# Patient Record
Sex: MALE | Race: WHITE | NOT HISPANIC OR LATINO | Employment: FULL TIME | URBAN - METROPOLITAN AREA
[De-identification: names, ages, dates, MRNs, and addresses within clinical notes are randomized per-mention and may not be internally consistent; named-entity substitution may affect disease eponyms.]

---

## 2018-04-26 ENCOUNTER — HOSPITAL ENCOUNTER (OUTPATIENT)
Facility: MEDICAL CENTER | Age: 63
End: 2018-04-26
Attending: SURGERY | Admitting: SURGERY
Payer: COMMERCIAL

## 2019-01-09 ENCOUNTER — APPOINTMENT (OUTPATIENT)
Dept: RADIOLOGY | Facility: MEDICAL CENTER | Age: 64
End: 2019-01-09
Attending: FAMILY MEDICINE
Payer: COMMERCIAL

## 2019-06-24 ENCOUNTER — OFFICE VISIT (OUTPATIENT)
Dept: URGENT CARE | Facility: MEDICAL CENTER | Age: 64
End: 2019-06-24
Payer: COMMERCIAL

## 2019-06-24 VITALS
HEART RATE: 83 BPM | TEMPERATURE: 97.6 F | SYSTOLIC BLOOD PRESSURE: 126 MMHG | HEIGHT: 71 IN | BODY MASS INDEX: 27.3 KG/M2 | DIASTOLIC BLOOD PRESSURE: 90 MMHG | OXYGEN SATURATION: 93 % | WEIGHT: 195 LBS

## 2019-06-24 DIAGNOSIS — R05.9 COUGH: ICD-10-CM

## 2019-06-24 DIAGNOSIS — J22 LRTI (LOWER RESPIRATORY TRACT INFECTION): ICD-10-CM

## 2019-06-24 DIAGNOSIS — R06.2 WHEEZING: ICD-10-CM

## 2019-06-24 PROCEDURE — 99214 OFFICE O/P EST MOD 30 MIN: CPT | Performed by: NURSE PRACTITIONER

## 2019-06-24 RX ORDER — ESCITALOPRAM OXALATE 20 MG/1
TABLET ORAL
COMMUNITY
Start: 2017-12-22 | End: 2022-02-22

## 2019-06-24 RX ORDER — BENZONATATE 100 MG/1
100 CAPSULE ORAL 3 TIMES DAILY PRN
Qty: 40 CAP | Refills: 0 | Status: SHIPPED | OUTPATIENT
Start: 2019-06-24 | End: 2022-02-22

## 2019-06-24 RX ORDER — AMOXICILLIN AND CLAVULANATE POTASSIUM 875; 125 MG/1; MG/1
1 TABLET, FILM COATED ORAL 2 TIMES DAILY
Qty: 20 TAB | Refills: 0 | Status: SHIPPED | OUTPATIENT
Start: 2019-06-24 | End: 2019-07-04

## 2019-06-24 RX ORDER — ALBUTEROL SULFATE 90 UG/1
2 AEROSOL, METERED RESPIRATORY (INHALATION) EVERY 6 HOURS PRN
Qty: 8.5 G | Refills: 0 | Status: SHIPPED | OUTPATIENT
Start: 2019-06-24 | End: 2022-02-22

## 2019-06-24 RX ORDER — ESCITALOPRAM OXALATE 20 MG/1
TABLET ORAL
Refills: 1 | COMMUNITY
Start: 2019-04-17 | End: 2022-02-22

## 2019-06-24 RX ORDER — METHYLPHENIDATE HYDROCHLORIDE 20 MG/1
CAPSULE, EXTENDED RELEASE ORAL
COMMUNITY
Start: 2016-12-07 | End: 2022-02-22

## 2019-06-24 ASSESSMENT — ENCOUNTER SYMPTOMS
SHORTNESS OF BREATH: 0
COUGH: 1
HEADACHES: 0
SORE THROAT: 0
HEARTBURN: 0
CHILLS: 1
WEIGHT LOSS: 0
HEMOPTYSIS: 0
SWEATS: 0
MYALGIAS: 0
WHEEZING: 1
RHINORRHEA: 0
FEVER: 0

## 2019-06-24 ASSESSMENT — COPD QUESTIONNAIRES: COPD: 0

## 2019-06-24 NOTE — PROGRESS NOTES
"Subjective:      Roderick Diane is a 63 y.o. male who presents with Congestion (x6 days. Chest congestion, productive cough, SOB.)        Reviewed past medical, surgical and family history with patient. Reviewed prescription and OTC medications with patient in electronic health record today.     No Known Allergies      Cough   This is a new problem. The current episode started in the past 7 days. The problem has been rapidly worsening. The problem occurs constantly. The cough is non-productive. Associated symptoms include chills and wheezing. Pertinent negatives include no chest pain, ear congestion, ear pain, fever, headaches, heartburn, hemoptysis, myalgias, nasal congestion, postnasal drip, rash, rhinorrhea, sore throat, shortness of breath, sweats or weight loss. Nothing aggravates the symptoms. He has tried OTC cough suppressant for the symptoms. The treatment provided mild relief. His past medical history is significant for bronchitis. There is no history of asthma, COPD, emphysema, environmental allergies or pneumonia.         Review of Systems   Constitutional: Positive for chills. Negative for fever and weight loss.   HENT: Negative for ear pain, postnasal drip, rhinorrhea and sore throat.    Respiratory: Positive for cough and wheezing. Negative for hemoptysis and shortness of breath.    Cardiovascular: Negative for chest pain.   Gastrointestinal: Negative for heartburn.   Musculoskeletal: Negative for myalgias.   Skin: Negative for rash.   Neurological: Negative for headaches.   Endo/Heme/Allergies: Negative for environmental allergies.           Objective:     /90   Pulse 83   Temp 36.4 °C (97.6 °F)   Ht 1.803 m (5' 11\")   Wt 88.5 kg (195 lb)   SpO2 93%   BMI 27.20 kg/m²      Physical Exam   Constitutional: He is oriented to person, place, and time. Vital signs are normal. He appears well-developed and well-nourished.  Non-toxic appearance. No distress.   HENT:   Head: Normocephalic and " atraumatic.   Right Ear: External ear normal.   Left Ear: External ear normal.   Nose: Nose normal.   Mouth/Throat: Oropharynx is clear and moist.   Eyes: Pupils are equal, round, and reactive to light. Conjunctivae are normal. Right eye exhibits no discharge. Left eye exhibits no discharge.   Neck: Normal range of motion. Neck supple.   Cardiovascular: Normal rate and regular rhythm.    Pulmonary/Chest: Effort normal. No tachypnea. He has wheezes. He has rhonchi.   Lymphadenopathy:        Head (right side): No submental, no submandibular and no tonsillar adenopathy present.        Head (left side): No submental, no submandibular and no tonsillar adenopathy present.     He has no cervical adenopathy.        Right: No supraclavicular adenopathy present.        Left: No supraclavicular adenopathy present.   Neurological: He is alert and oriented to person, place, and time.   Skin: Skin is warm and dry. Capillary refill takes less than 2 seconds.   Psychiatric: He has a normal mood and affect. His behavior is normal.   Nursing note and vitals reviewed.              Assessment/Plan:     1. LRTI (lower respiratory tract infection)  amoxicillin-clavulanate (AUGMENTIN) 875-125 MG Tab   2. Wheezing  albuterol 108 (90 Base) MCG/ACT Aero Soln inhalation aerosol   3. Cough  benzonatate (TESSALON) 100 MG Cap     Educated in proper administration of medication(s) ordered today including safety, possible SE, risks, benefits, rationale and alternatives to therapy.   Return to clinic or PCP  5-7 days if current symptoms are not resolving in a satisfactory manner or sooner if new or worsening symptoms occur.   Patient was advised of signs and symptoms which would warrant further evaluation and /or emergent evaluation in ER.  Verbalized agreement with this treatment plan and seemed to understand without barriers. Questions were encouraged and answered to patients satisfaction.   Aftercare instructions were given to pt

## 2021-03-03 DIAGNOSIS — Z23 NEED FOR VACCINATION: ICD-10-CM

## 2022-01-25 ENCOUNTER — TELEPHONE (OUTPATIENT)
Dept: MEDICAL GROUP | Facility: OTHER | Age: 67
End: 2022-01-25

## 2022-01-25 DIAGNOSIS — Z00.00 PREVENTATIVE HEALTH CARE: Primary | ICD-10-CM

## 2022-01-31 NOTE — TELEPHONE ENCOUNTER
Phone Number Called: [phone]    Call outcome: Spoke to patient regarding message below.    Message: Called and spoke with patient, advised that his labs have been ordered, he will come pick them up from the office.

## 2022-02-08 ENCOUNTER — APPOINTMENT (OUTPATIENT)
Dept: MEDICAL GROUP | Facility: OTHER | Age: 67
End: 2022-02-08
Payer: MEDICARE

## 2022-02-22 ENCOUNTER — OFFICE VISIT (OUTPATIENT)
Dept: MEDICAL GROUP | Facility: OTHER | Age: 67
End: 2022-02-22
Payer: MEDICARE

## 2022-02-22 VITALS
HEIGHT: 71 IN | BODY MASS INDEX: 29.82 KG/M2 | TEMPERATURE: 99.1 F | SYSTOLIC BLOOD PRESSURE: 150 MMHG | DIASTOLIC BLOOD PRESSURE: 80 MMHG | RESPIRATION RATE: 95 BRPM | WEIGHT: 213 LBS | HEART RATE: 113 BPM

## 2022-02-22 DIAGNOSIS — R79.89 ELEVATED TSH: ICD-10-CM

## 2022-02-22 DIAGNOSIS — E78.00 PURE HYPERCHOLESTEROLEMIA: ICD-10-CM

## 2022-02-22 DIAGNOSIS — Z12.11 SCREENING FOR COLON CANCER: ICD-10-CM

## 2022-02-22 DIAGNOSIS — Z00.00 PREVENTATIVE HEALTH CARE: ICD-10-CM

## 2022-02-22 DIAGNOSIS — F51.01 PRIMARY INSOMNIA: ICD-10-CM

## 2022-02-22 DIAGNOSIS — Z00.00 ENCOUNTER FOR PREVENTIVE CARE: ICD-10-CM

## 2022-02-22 DIAGNOSIS — E03.8 SUBCLINICAL HYPOTHYROIDISM: ICD-10-CM

## 2022-02-22 DIAGNOSIS — E78.1 PURE HYPERTRIGLYCERIDEMIA: ICD-10-CM

## 2022-02-22 DIAGNOSIS — E66.3 OVERWEIGHT (BMI 25.0-29.9): ICD-10-CM

## 2022-02-22 DIAGNOSIS — R11.0 NAUSEA: ICD-10-CM

## 2022-02-22 PROCEDURE — G0008 ADMIN INFLUENZA VIRUS VAC: HCPCS | Performed by: FAMILY MEDICINE

## 2022-02-22 PROCEDURE — 90472 IMMUNIZATION ADMIN EACH ADD: CPT | Performed by: FAMILY MEDICINE

## 2022-02-22 PROCEDURE — 90715 TDAP VACCINE 7 YRS/> IM: CPT | Performed by: FAMILY MEDICINE

## 2022-02-22 PROCEDURE — 90662 IIV NO PRSV INCREASED AG IM: CPT | Performed by: FAMILY MEDICINE

## 2022-02-22 PROCEDURE — 99214 OFFICE O/P EST MOD 30 MIN: CPT | Mod: 25 | Performed by: FAMILY MEDICINE

## 2022-02-22 RX ORDER — TRAZODONE HYDROCHLORIDE 50 MG/1
TABLET ORAL
Qty: 30 TABLET | Refills: 3 | Status: SHIPPED | OUTPATIENT
Start: 2022-02-22 | End: 2023-01-04

## 2022-02-22 RX ORDER — PROMETHAZINE HYDROCHLORIDE 25 MG/1
25 TABLET ORAL EVERY 6 HOURS PRN
Qty: 60 TABLET | Refills: 1 | Status: SHIPPED | OUTPATIENT
Start: 2022-02-22

## 2022-02-22 ASSESSMENT — ENCOUNTER SYMPTOMS
SORE THROAT: 0
PALPITATIONS: 0
FEVER: 0
NAUSEA: 0
DIARRHEA: 0
CHILLS: 0
EYE REDNESS: 0
HEARTBURN: 0
GENERAL WELL-BEING: GOOD
TINGLING: 0
FOCAL WEAKNESS: 0
VOMITING: 0
SHORTNESS OF BREATH: 0
COUGH: 0
BRUISES/BLEEDS EASILY: 0

## 2022-02-22 ASSESSMENT — PATIENT HEALTH QUESTIONNAIRE - PHQ9: CLINICAL INTERPRETATION OF PHQ2 SCORE: 0

## 2022-02-22 ASSESSMENT — ACTIVITIES OF DAILY LIVING (ADL): BATHING_REQUIRES_ASSISTANCE: 0

## 2022-02-22 NOTE — PROGRESS NOTES
Subjective:   CC:   Chief Complaint   Patient presents with   • Annual Exam     Lab review       HPI: Roderick is a 66 y.o. male who presents today for the following problems:    Problem   Subclinical Hypothyroidism    Patient has elevated TSH. Does feel fatigued and a lack of motivation. Does not have any hair loss edema.     Pure Hypercholesterolemia    He cannot tolerate statins. Has high cholesterol.  but HDL 66.     Overweight (Bmi 25.0-29.9)    Has gained some weight. Does not exercise on a regular basis.     Primary Insomnia    Patient has no trouble falling asleep but cannot sleep more than 4 to 5 hours without waking up. He is preferred Ambien in the past but now is out of all medication. He thinks trazodone helped in the past. He would like to try this again.     Nausea    Patient has occasional bouts of nausea for he wants a refill of his promethazine.     Encounter for Preventive Care    See plan         Current Outpatient Medications   Medication Sig Dispense Refill   • promethazine (PHENERGAN) 25 MG Tab Take 1 Tablet by mouth every 6 hours as needed for Nausea/Vomiting. 60 Tablet 1   • traZODone (DESYREL) 50 MG Tab 1/2- 1 pill po qhs 30 Tablet 3   • Pneumococcal 13-Catherine Conj Vacc (PREVNAR 13 IM) PREVNAR 13 SUSP (Patient not taking: Reported on 2022)     • Zoster Vac Recomb Adjuvanted (SHINGRIX) 50 MCG/0.5ML Recon Susp SHINGRIX 50 MCG/0.5ML SUSR (Patient not taking: Reported on 2022)       No current facility-administered medications for this visit.       Social History     Tobacco Use   • Smoking status: Former Smoker     Types: Cigarettes     Quit date: 2001     Years since quittin.1   • Smokeless tobacco: Never Used   Substance Use Topics   • Drug use: No     Comment: denies       Review of Systems   Constitutional: Negative for chills and fever.   HENT: Negative for sore throat.    Eyes: Negative for redness.   Respiratory: Negative for cough and shortness of breath.   "  Cardiovascular: Negative for chest pain and palpitations.   Gastrointestinal: Negative for diarrhea, heartburn, nausea and vomiting.   Skin: Negative for rash.   Neurological: Negative for tingling and focal weakness.   Endo/Heme/Allergies: Does not bruise/bleed easily.       Objective:     Vitals:    02/22/22 0946   BP: 150/80   BP Location: Right arm   Patient Position: Sitting   Pulse: (!) 113   Resp: (!) 95   Temp: 37.3 °C (99.1 °F)   Weight: 96.6 kg (213 lb)   Height: 1.803 m (5' 11\")     Body mass index is 29.71 kg/m².     Physical Exam:  Gen: Well developed, well nourished in no acute distress.   Skin: Pink, warm, and dry -no skin lesions noted. Few pigmented lesions on the back which appear benign and on the chest which appear benign.  HEENT: conjunctiva non-injected, sclera non-icteric. EOMs intact.   Neck: Supple, trachea midline. No adenopathy or masses in the neck or supraclavicular regions.  Lungs: Effort is normal. Clear to auscultation bilaterally with good excursion.  CV: regular rate and rhythm. 2/2 femoral pulses bilaterally. 2/2 carotids bilaterally  Abdomen: soft, nontender, + BS. No HSM.  No CVAT  Ext: no edema, color normal, vascularity normal, temperature normal  Alert and oriented Eye contact is good, speech goal directed, affect calm    Assessment & Plan:   Nausea  Discussed risks and benefits including tar dive dyskinesia which she is never experienced and this medication helps him a lot with his occasional nausea so he would like a refill. Refill prescribed    Primary insomnia  Discussed risks and benefits of trazodone. Prescription given. If this does not help we can try something else such as Elavil or Seroquel. Prefer not to use Ambien which is a controlled substance. He will follow up if not better.    Overweight (BMI 25.0-29.9)  Would like referral to nutrition. Referral done.    Pure hypercholesterolemia  Referral to cardiology to discuss newer cholesterol medication and whether he " is a candidate. Did discuss that this might be quite expensive. As it is a newer medication.    Subclinical hypothyroidism  Would like to hold off on thyroid replacement since it is lifelong. Previous TSH was also elevated but was 7 now 5.35. We will repeat in 6 months. Lab prescription given. I also gave him a repeat in the past which she did not get but he said he changed insurance and he will now get the labs.    Encounter for preventive care  Patient needs colonoscopy Shingrix and pneumococcal vaccine Pneumovax which she will get at the pharmacy. Referral given for colonoscopy. Given Tdap and flu in the clinic today.       Followup: No follow-ups on file.    Kevin Monae M.D.    Please note that this dictation was created using voice recognition software. I have made every reasonable attempt to correct obvious errors, but I expect that there are errors of grammar and possibly content that I did not discover before finalizing the note.

## 2022-02-23 NOTE — ASSESSMENT & PLAN NOTE
Discussed risks and benefits of trazodone. Prescription given. If this does not help we can try something else such as Elavil or Seroquel. Prefer not to use Ambien which is a controlled substance. He will follow up if not better.

## 2022-02-23 NOTE — ASSESSMENT & PLAN NOTE
Patient needs colonoscopy Shingrix and pneumococcal vaccine Pneumovax which she will get at the pharmacy. Referral given for colonoscopy. Given Tdap and flu in the clinic today.

## 2022-02-23 NOTE — ASSESSMENT & PLAN NOTE
Referral to cardiology to discuss newer cholesterol medication and whether he is a candidate. Did discuss that this might be quite expensive. As it is a newer medication.

## 2022-02-23 NOTE — ASSESSMENT & PLAN NOTE
Discussed risks and benefits including tar dive dyskinesia which she is never experienced and this medication helps him a lot with his occasional nausea so he would like a refill. Refill prescribed

## 2022-02-23 NOTE — ASSESSMENT & PLAN NOTE
Would like to hold off on thyroid replacement since it is lifelong. Previous TSH was also elevated but was 7 now 5.35. We will repeat in 6 months. Lab prescription given. I also gave him a repeat in the past which she did not get but he said he changed insurance and he will now get the labs.

## 2022-11-11 ENCOUNTER — TELEPHONE (OUTPATIENT)
Dept: MEDICAL GROUP | Facility: OTHER | Age: 67
End: 2022-11-11
Payer: MEDICARE

## 2022-11-11 DIAGNOSIS — Z00.00 ENCOUNTER FOR PREVENTIVE CARE: ICD-10-CM

## 2022-11-11 DIAGNOSIS — Z12.5 PROSTATE CANCER SCREENING: ICD-10-CM

## 2022-11-11 DIAGNOSIS — E78.00 PURE HYPERCHOLESTEROLEMIA: ICD-10-CM

## 2022-11-11 DIAGNOSIS — R79.89 ELEVATED TSH: ICD-10-CM

## 2022-11-11 NOTE — TELEPHONE ENCOUNTER
Patient has an appointment with you on 11/23 and he states he usually has lab work ordered by you before his appointments please advise thank you

## 2022-11-14 ENCOUNTER — NON-PROVIDER VISIT (OUTPATIENT)
Dept: OCCUPATIONAL MEDICINE | Facility: CLINIC | Age: 67
End: 2022-11-14
Payer: MEDICARE

## 2022-11-14 ENCOUNTER — HOSPITAL ENCOUNTER (OUTPATIENT)
Facility: MEDICAL CENTER | Age: 67
End: 2022-11-14
Attending: PREVENTIVE MEDICINE
Payer: COMMERCIAL

## 2022-11-14 DIAGNOSIS — Z77.21 EXPOSURE TO BLOOD OR BODY FLUID: ICD-10-CM

## 2022-11-14 LAB
EXPOSED MRN EXMRN: NORMAL
EXPOSED MRN EXMRN: NORMAL
HBV SURFACE AG SER QL: NORMAL
HCV AB SER QL: NORMAL
HIV 1+2 AB+HIV1 P24 AG SERPL QL IA: NORMAL

## 2022-11-21 ENCOUNTER — TELEPHONE (OUTPATIENT)
Dept: MEDICAL GROUP | Facility: OTHER | Age: 67
End: 2022-11-21
Payer: MEDICARE

## 2022-12-05 ENCOUNTER — OFFICE VISIT (OUTPATIENT)
Dept: MEDICAL GROUP | Facility: CLINIC | Age: 67
End: 2022-12-05
Payer: MEDICARE

## 2022-12-05 ENCOUNTER — APPOINTMENT (OUTPATIENT)
Dept: RADIOLOGY | Facility: MEDICAL CENTER | Age: 67
End: 2022-12-05
Payer: MEDICARE

## 2022-12-05 VITALS — HEIGHT: 72 IN | BODY MASS INDEX: 27.09 KG/M2 | WEIGHT: 200 LBS | RESPIRATION RATE: 16 BRPM

## 2022-12-05 DIAGNOSIS — J06.9 VIRAL UPPER RESPIRATORY TRACT INFECTION: ICD-10-CM

## 2022-12-05 DIAGNOSIS — R05.1 ACUTE COUGH: Primary | ICD-10-CM

## 2022-12-05 DIAGNOSIS — R05.1 ACUTE COUGH: ICD-10-CM

## 2022-12-05 LAB
EXTERNAL QUALITY CONTROL: NORMAL
FLUAV+FLUBV AG SPEC QL IA: NEGATIVE
INT CON NEG: NEGATIVE
INT CON NEG: NEGATIVE
INT CON POS: POSITIVE
INT CON POS: POSITIVE
SARS-COV+SARS-COV-2 AG RESP QL IA.RAPID: NEGATIVE

## 2022-12-05 PROCEDURE — 99213 OFFICE O/P EST LOW 20 MIN: CPT | Mod: GE,CS

## 2022-12-05 PROCEDURE — 71046 X-RAY EXAM CHEST 2 VIEWS: CPT

## 2022-12-05 PROCEDURE — 87426 SARSCOV CORONAVIRUS AG IA: CPT

## 2022-12-05 PROCEDURE — 87804 INFLUENZA ASSAY W/OPTIC: CPT

## 2022-12-05 RX ORDER — QUETIAPINE FUMARATE 25 MG/1
TABLET, FILM COATED ORAL
COMMUNITY
Start: 2022-11-23

## 2022-12-05 RX ORDER — ALBUTEROL SULFATE 90 UG/1
2 AEROSOL, METERED RESPIRATORY (INHALATION) EVERY 4 HOURS PRN
Qty: 1 EACH | Refills: 0 | Status: SHIPPED | OUTPATIENT
Start: 2022-12-05

## 2022-12-05 RX ORDER — VORTIOXETINE 10 MG/1
TABLET, FILM COATED ORAL
COMMUNITY
Start: 2022-11-21

## 2022-12-05 ASSESSMENT — FIBROSIS 4 INDEX: FIB4 SCORE: 1.16

## 2022-12-05 NOTE — LETTER
This letter is to indicate that you were seen in our office today, 12/05/22. You are excused from going to school/work until 12/7/22. You are cleared to return to school/work on 12/7/22.    Your restrictions on returning to school/work are:    1.) Nothing.

## 2022-12-05 NOTE — LETTER
This letter is to indicate that you were seen in our office today, 12/05/22. You are excused from going to school/work until 12/7/22.     Your restrictions on returning to school/work are:    1.) Nothing

## 2022-12-06 PROBLEM — J06.9 VIRAL UPPER RESPIRATORY TRACT INFECTION: Status: ACTIVE | Noted: 2022-12-06

## 2022-12-06 NOTE — ASSESSMENT & PLAN NOTE
I believe it is likely that this patient is experiencing a viral URI.  Given his history and physical exam it sounds likely that he has a component of asthma that is being triggered by this URI.     Plan:    - Albuterol inhaler as needed for cough and/or wheezing.  Refill sent to pharmacy.  -Flu and COVID swab in office negative  -Chest x-ray 2 view without evidence of acute pulmonary process like pneumonia.  -Patient afebrile here.  -Discussed with patient that if he develops a fever, shortness of breath, worsening of symptoms go to see him back in the office.  Counseled patient on ED precautions.

## 2022-12-06 NOTE — PROGRESS NOTES
"Subjective:     CC: URI/cough    HPI:   Roderick presents today with:    Problem   Viral Upper Respiratory Tract Infection    - Symptoms of been going on for little less than a week.  - Cough, runny nose, congestion  - Denies fevers chills, nausea, vomiting, diarrhea, shortness of breath  -Unknown if he has sick contacts.  -Has been using albuterol which seems to relieve his cough.         Current Outpatient Medications Ordered in Epic   Medication Sig Dispense Refill    QUEtiapine (SEROQUEL) 25 MG Tab       TRINTELLIX 10 MG Tab       albuterol 108 (90 Base) MCG/ACT Aero Soln inhalation aerosol Inhale 2 Puffs every four hours as needed for Shortness of Breath. 1 Each 0    traZODone (DESYREL) 50 MG Tab 1/2- 1 pill po qhs 30 Tablet 3    promethazine (PHENERGAN) 25 MG Tab Take 1 Tablet by mouth every 6 hours as needed for Nausea/Vomiting. (Patient not taking: Reported on 12/5/2022) 60 Tablet 1     No current Epic-ordered facility-administered medications on file.       ROS:  Gen: no fevers/chills, no changes in weight  Eyes: no changes in vision  ENT: no sore throat, no hearing loss, no bloody nose  Pulm: no sob, no cough  CV: no chest pain, no palpitations  GI: no nausea/vomiting, no diarrhea  : no dysuria  MSk: no myalgias  Skin: no rash  Neuro: no headaches, no numbness/tingling  Heme/Lymph: no easy bruising      Objective:     Exam:  BP (P) 124/82 (BP Location: Left arm, Patient Position: Sitting, BP Cuff Size: Adult)   Pulse (P) 87   Temp (P) 36.7 °C (98 °F) (Temporal)   Resp 16   Ht 1.816 m (5' 11.5\")   Wt 90.7 kg (200 lb)   SpO2 (P) 95%   BMI 27.51 kg/m²  Body mass index is 27.51 kg/m².    Gen: Alert and oriented, No apparent distress.  HEENT: PERRL, EOMI, NCAT, uvula midline, no exudates  Neck: Neck is supple without lymphadenopathy.  Lungs: Occasional expiratory wheeze in bilateral lung fields.  Coarse breath sounds bilaterally.  No localization.  No shortness of breath no increased work of " breathing  CV: Regular rate and rhythm. No murmurs, rubs, or gallops.  Abd:  Soft, Non-distended, non-tender  Ext: No clubbing, cyanosis, edema.  Skin: No rashes, no erythema      Labs: Covid/flu negative    Assessment & Plan:     66 y.o. male with the following:     Problem List Items Addressed This Visit       Viral upper respiratory tract infection     I believe it is likely that this patient is experiencing a viral URI.  Given his history and physical exam it sounds likely that he has a component of asthma that is being triggered by this URI.     Plan:    - Albuterol inhaler as needed for cough and/or wheezing.  Refill sent to pharmacy.  -Flu and COVID swab in office negative  -Chest x-ray 2 view without evidence of acute pulmonary process like pneumonia.  -Patient afebrile here.  -Discussed with patient that if he develops a fever, shortness of breath, worsening of symptoms go to see him back in the office.  Counseled patient on ED precautions.          Other Visit Diagnoses       Acute cough    -  Primary                No follow-ups on file.

## 2022-12-07 DIAGNOSIS — J98.11 ATELECTASIS: ICD-10-CM

## 2022-12-07 RX ORDER — AMOXICILLIN 500 MG/1
CAPSULE ORAL
Qty: 60 CAPSULE | Refills: 0 | Status: SHIPPED
Start: 2022-12-07 | End: 2023-02-23

## 2023-01-04 ENCOUNTER — OFFICE VISIT (OUTPATIENT)
Dept: MEDICAL GROUP | Facility: OTHER | Age: 68
End: 2023-01-04
Payer: MEDICARE

## 2023-01-04 VITALS
HEIGHT: 72 IN | WEIGHT: 198 LBS | DIASTOLIC BLOOD PRESSURE: 78 MMHG | HEART RATE: 71 BPM | BODY MASS INDEX: 26.82 KG/M2 | OXYGEN SATURATION: 96 % | SYSTOLIC BLOOD PRESSURE: 134 MMHG

## 2023-01-04 DIAGNOSIS — R11.0 NAUSEA: ICD-10-CM

## 2023-01-04 DIAGNOSIS — Z91.89 OTHER SPECIFIED PERSONAL RISK FACTORS, NOT ELSEWHERE CLASSIFIED: ICD-10-CM

## 2023-01-04 PROCEDURE — G0439 PPPS, SUBSEQ VISIT: HCPCS | Performed by: FAMILY MEDICINE

## 2023-01-04 RX ORDER — OMEPRAZOLE 20 MG/1
20 CAPSULE, DELAYED RELEASE ORAL DAILY
Qty: 90 CAPSULE | Refills: 1 | Status: SHIPPED
Start: 2023-01-04 | End: 2023-02-23

## 2023-01-04 ASSESSMENT — ACTIVITIES OF DAILY LIVING (ADL): BATHING_REQUIRES_ASSISTANCE: 0

## 2023-01-04 ASSESSMENT — FIBROSIS 4 INDEX: FIB4 SCORE: 1.65

## 2023-01-04 ASSESSMENT — PATIENT HEALTH QUESTIONNAIRE - PHQ9: CLINICAL INTERPRETATION OF PHQ2 SCORE: 0

## 2023-01-04 ASSESSMENT — ENCOUNTER SYMPTOMS: GENERAL WELL-BEING: GOOD

## 2023-01-04 NOTE — PROGRESS NOTES
Chief Complaint   Patient presents with    Annual Exam     Lab work follow up  discuss hernias       HPI:  Roderick is a 67 y.o. here for Medicare Annual Wellness Visit  Feels better than he has felt in the last 2 years.  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    Patient Active Problem List    Diagnosis Date Noted    Viral upper respiratory tract infection 12/06/2022    Subclinical hypothyroidism 02/22/2022    Pure hypercholesterolemia 02/22/2022    Overweight (BMI 25.0-29.9) 02/22/2022    Primary insomnia 02/22/2022    Nausea 02/22/2022    Encounter for preventive care 02/22/2022    Depression 09/10/2013    ADHD (attention deficit hyperactivity disorder) 09/10/2013    Other and unspecified hyperlipidemia 09/10/2013    Syncope        Current Outpatient Medications   Medication Sig Dispense Refill    omeprazole (PRILOSEC) 20 MG delayed-release capsule Take 1 Capsule by mouth every day. 90 Capsule 1    QUEtiapine (SEROQUEL) 25 MG Tab       TRINTELLIX 10 MG Tab       amoxicillin (AMOXIL) 500 MG Cap 2 po tid (Patient not taking: Reported on 1/4/2023) 60 Capsule 0    albuterol 108 (90 Base) MCG/ACT Aero Soln inhalation aerosol Inhale 2 Puffs every four hours as needed for Shortness of Breath. (Patient not taking: Reported on 1/4/2023) 1 Each 0    promethazine (PHENERGAN) 25 MG Tab Take 1 Tablet by mouth every 6 hours as needed for Nausea/Vomiting. (Patient not taking: Reported on 12/5/2022) 60 Tablet 1     No current facility-administered medications for this visit.        Patient is taking medications as noted in medication list.  Current supplements as per medication list.     Allergies: Patient has no known allergies.    Current social contact/activities:     Is patient current with immunizations? No, due for SHINGRIX (Shingles). Patient is interested in receiving SHINGRIX (Shingles) today.    He  reports that he quit smoking about 22 years ago. His smoking use included cigarettes. He has never used  smokeless tobacco. He reports current alcohol use. He reports that he does not use drugs.  Counseling given: Not Answered      ROS:    Gait: Uses no assistive device Ostomy: No   Other tubes: No   Amputations: No   Chronic oxygen use No   Last eye exam 02/01/22   Wears hearing aids: No   : Denies any urinary leakage during the last 6 months    Screening:    Depression Screening  Little interest or pleasure in doing things?  0  Feeling down, depressed, or hopeless? 0 - not at all  Patient Health Questionnaire Score: 0    If depressive symptoms identified deferred to follow up visit unless specifically addressed in assessment and plan.    Interpretation of PHQ-9 Total Score   Score Severity   1-4 No Depression   5-9 Mild Depression   10-14 Moderate Depression   15-19 Moderately Severe Depression   20-27 Severe Depression    Screening for Cognitive Impairment  Three Minute Recall (daughter, heaven, mountain)  31/3    [place] clock face with all 12 numbers and set the hands to show 10 past 11.  Yes    If cognitive concerns identified, deferred for follow up unless specifically addressed in assessment and plan.    Fall Risk Assessment  Has the patient had two or more falls in the last year or any fall with injury in the last year?  Yes  If fall risk identified, deferred for follow up unless specifically addressed in assessment and plan.    Safety Assessment  Throw rugs on floor.  Yes  Handrails on all stairs.  Yes  Good lighting in all hallways.  Yes  Difficulty hearing.  No  Patient counseled about all safety risks that were identified.    Functional Assessment ADLs  Are there any barriers preventing you from cooking for yourself or meeting nutritional needs?  No.    Are there any barriers preventing you from driving safely or obtaining transportation?  No.    Are there any barriers preventing you from using a telephone or calling for help?  No.    Are there any barriers preventing you from shopping?  No.    Are there  any barriers preventing you from taking care of your own finances?  No.    Are there any barriers preventing you from managing your medications?  No.    Are there any barriers preventing you from showering, bathing or dressing yourself?  No.    Are you currently engaging in any exercise or physical activity?  Yes.     What is your perception of your health?  Good.    Advance Care Planning  Do you have an Advance Directive, Living Will, Durable Power of , or POLST? No               Health Maintenance Summary            Overdue - ABDOMINAL AORTIC ANEURYSM (AAA) SCREEN (Once) Overdue - never done      No completion history exists for this topic.              Overdue - IMM ZOSTER VACCINES (3 of 3) Overdue since 5/24/2022 03/29/2022  Imm Admin: Zoster Vaccine Recombinant (RZV) (SHINGRIX)    01/12/2017  Imm Admin: Zoster Vaccine Live (ZVL) (Zostavax) - HISTORICAL DATA              Annual Wellness Visit (Every 366 Days) Next due on 1/5/2024 01/04/2023  Level of Service: ANNUAL WELLNESS VISIT-INCLUDES PPPS SUBSEQUE*              Ordered - COLORECTAL CANCER SCREENING (COLONOSCOPY - Every 10 Years) Ordered on 2/22/2022 02/01/2022  COLONOSCOPY (Reason not specified)              IMM DTaP/Tdap/Td Vaccine (2 - Td or Tdap) Next due on 2/22/2032 02/22/2022  Imm Admin: Tdap Vaccine    11/24/2015  Imm Admin: TD Vaccine    03/30/2005  Imm Admin: TD Vaccine              HEPATITIS C SCREENING  Completed      02/21/2022  HEP C VIRUS ANTIBODY              IMM PNEUMOCOCCAL VACCINE: 65+ Years (Series Information) Completed      03/29/2022  Imm Admin: Pneumococcal Conjugate Vaccine (PCV20)    11/23/2019  Imm Admin: Pneumococcal Conjugate Vaccine (Prevnar/PCV-13)              IMM INFLUENZA (Series Information) Completed      11/21/2022  Imm Admin: Influenza Vaccine Adult HD    02/22/2022  Imm Admin: Influenza Vaccine Adult HD    10/31/2020  Imm Admin: Influenza, Unspecified - HISTORICAL DATA    12/27/2019  Imm  "Admin: Influenza Vaccine Quad Inj (Pf)    2018  Imm Admin: Influenza Vaccine Quad Inj (Pf)    Only the first 5 history entries have been loaded, but more history exists.              COVID-19 Vaccine (Series Information) Completed      2022  Imm Admin: PFIZER BIVALENT BOOSTER SARS-COV-2 VACCINE (12+)    10/20/2021  Imm Admin: PFIZER PURPLE CAP SARS-COV-2 VACCINATION (12+)    2021  Imm Admin: PFIZER PURPLE CAP SARS-COV-2 VACCINATION (12+)    2021  Imm Admin: PFIZER PURPLE CAP SARS-COV-2 VACCINATION (12+)              IMM HEP B VACCINE (Series Information) Aged Out      No completion history exists for this topic.              IMM MENINGOCOCCAL ACWY VACCINE (Series Information) Aged Out      No completion history exists for this topic.                    Patient Care Team:  Kevin Monae M.D. as PCP - General (Family Medicine)    Social History     Tobacco Use    Smoking status: Former     Types: Cigarettes     Quit date: 2001     Years since quittin.0    Smokeless tobacco: Never   Vaping Use    Vaping Use: Never used   Substance Use Topics    Alcohol use: Yes     Comment: pt refuses to comment     Drug use: No     Comment: denies     Family History   Problem Relation Age of Onset    Heart Disease Father     Heart Attack Father      He  has a past medical history of ADHD (attention deficit hyperactivity disorder) (9/10/2013), Depression (9/10/2013), Other and unspecified hyperlipidemia (9/10/2013), PSVT (paroxysmal supraventricular tachycardia) (HCC) (9/10/2013), and Syncope.   History reviewed. No pertinent surgical history.    Exam:   /78 (BP Location: Right arm, Patient Position: Sitting)   Pulse 71   Ht 1.816 m (5' 11.5\")   Wt 89.8 kg (198 lb)   SpO2 96%  Body mass index is 27.23 kg/m².    Hearing good.    Dentition good  Alert, oriented in no acute distress.  Eye contact is good, speech goal directed, affect calm      Assessment and Plan. The following treatment and " monitoring plan is recommended: Doing well.  Needs to get third Shingrix vaccine.  Reviewed labs with him.  Labs were normal except for elevated cholesterol.  He does not want to start a statin.  Can he get a coronary calcium score.  1. Nausea  - omeprazole (PRILOSEC) 20 MG delayed-release capsule; Take 1 Capsule by mouth every day.  Dispense: 90 Capsule; Refill: 1    2. Other specified personal risk factors, not elsewhere classified  - CT-CARDIAC SCORING; Future       Services suggested: No services needed at this time  Health Care Screening recommendations as per orders if indicated.  Referrals offered: PT/OT/Nutrition counseling/Behavioral Health/Smoking cessation as per orders if indicated.    Discussion today about general wellness and lifestyle habits:    Prevent falls and reduce trip hazards; Cautioned about securing or removing rugs.  Have a working fire alarm and carbon monoxide detector;   Engage in regular physical activity and social activities     Follow-up: No follow-ups on file.

## 2023-02-23 ENCOUNTER — OFFICE VISIT (OUTPATIENT)
Dept: MEDICAL GROUP | Facility: CLINIC | Age: 68
End: 2023-02-23
Payer: MEDICARE

## 2023-02-23 VITALS
RESPIRATION RATE: 18 BRPM | SYSTOLIC BLOOD PRESSURE: 146 MMHG | HEIGHT: 72 IN | OXYGEN SATURATION: 97 % | HEART RATE: 65 BPM | DIASTOLIC BLOOD PRESSURE: 89 MMHG | BODY MASS INDEX: 26.82 KG/M2 | WEIGHT: 198 LBS

## 2023-02-23 DIAGNOSIS — K40.31 RECURRENT UNILATERAL INGUINAL HERNIA WITH OBSTRUCTION AND WITHOUT GANGRENE: ICD-10-CM

## 2023-02-23 DIAGNOSIS — M62.830 BACK MUSCLE SPASM: ICD-10-CM

## 2023-02-23 PROCEDURE — 99214 OFFICE O/P EST MOD 30 MIN: CPT | Mod: GC,25 | Performed by: STUDENT IN AN ORGANIZED HEALTH CARE EDUCATION/TRAINING PROGRAM

## 2023-02-23 PROCEDURE — 96372 THER/PROPH/DIAG INJ SC/IM: CPT | Performed by: STUDENT IN AN ORGANIZED HEALTH CARE EDUCATION/TRAINING PROGRAM

## 2023-02-23 RX ORDER — CYCLOBENZAPRINE HCL 10 MG
10 TABLET ORAL 3 TIMES DAILY PRN
Qty: 21 TABLET | Refills: 0 | Status: SHIPPED | OUTPATIENT
Start: 2023-02-23 | End: 2023-03-02

## 2023-02-23 RX ORDER — KETOROLAC TROMETHAMINE 30 MG/ML
30 INJECTION, SOLUTION INTRAMUSCULAR; INTRAVENOUS ONCE
Status: COMPLETED | OUTPATIENT
Start: 2023-02-23 | End: 2023-02-23

## 2023-02-23 RX ADMIN — KETOROLAC TROMETHAMINE 30 MG: 30 INJECTION, SOLUTION INTRAMUSCULAR; INTRAVENOUS at 17:08

## 2023-02-23 ASSESSMENT — FIBROSIS 4 INDEX: FIB4 SCORE: 1.65

## 2023-02-23 NOTE — LETTER
To Whom It May Concern,    RE: Roderick Carreon Benedicto  : 1955    Roderick was seen by myself for medical visit on 2023.  For medical reasons, I have advised that he be out of work for at least 24 hours.  I have cleared to Roderick to return to work on 2023.  I have advised that he perform no heavy lifting (more than 15 pounds) for 2 weeks after he returns to work.    Please do not hesitate to contact my office with any questions or concerns.    Sincerely,    Naresh Arango MD

## 2023-02-24 NOTE — PROGRESS NOTES
"[facility] OFFICE VISIT    Date: 2/23/2023    MRN: [id number]  Patient ID: Roderick Diane    SUBJECTIVE:  Roderick Diane is a 67 y.o. male here for evaluation of acute back and left groin pain.  Patient reports symptoms started approximately 2 to 3 days ago.  Patient notes that on Tuesday, he felt as though his back was somewhat sore, though does not recall any specific stretch or injury to the back.  Patient does work for [employer] and often lifts heavy packages, which she feels may have exacerbated the issue.  Starting yesterday, pain was noted to be severe, described as both sharp and spastic in nature.  This causes shooting pains along patient's back.  Roderick notes that he has a history of a left inguinal hernia, and felt as though yesterday there was a tearing sensation along the left groin.  Notes that when his back spasms this also seems to affect the groin.  Patient denies any nausea, vomiting, diarrhea, constipation, or blood in his stool at present.  Patient noted to have multiple episodes of spastic appearing pain during today's examination.    PMHx/PSHx:  Past Medical History:   Diagnosis Date    ADHD (attention deficit hyperactivity disorder) 9/10/2013    Depression 9/10/2013    Other and unspecified hyperlipidemia 9/10/2013    PSVT (paroxysmal supraventricular tachycardia) ([facility]) 9/10/2013    Syncope      History reviewed. No pertinent surgical history.    Allergies: Patient has no known allergies.    OBJECTIVE:  Vitals:    02/23/23 1550   BP: (!) 146/89   Pulse: 65   Resp: 18   SpO2: 97%     Vitals:    02/23/23 1550   BP: (!) 146/89   Weight: 89.8 kg (198 lb)   Height: 1.816 m (5' 11.5\")       Physical Examination:  General: Uncomfortable appearing male in no acute distress, resting on arrival to room  HEENT: Normocephalic, atraumatic, EOMI  Cardiovascular: Skin pink  Pulmonary: No tachypnea or retractions  Genitourinary: Patient declined chaperone, overt left inguinal hernia also palpable " on examination, nonreducible without surrounding erythema or tenderness to palpation  Extremities/MSK: Moves all spontaneously, tenderness to palpation of lumbar spinous processes and paravertebral muscles with unconscious splinting of left back  Neurological: Alert and oriented    ASSESSMENT & PLAN:  Roderick Diane is a 67 y.o. male here for evaluation of back muscle spasm, with other concerns as addressed below.    1. Back muscle spasm  cyclobenzaprine (FLEXERIL) 10 mg Tab    ketorolac (TORADOL) injection 30 mg      2. Recurrent unilateral inguinal hernia with obstruction and without gangrene  Referral to General Surgery          Orders Placed This Encounter    Referral to General Surgery    cyclobenzaprine (FLEXERIL) 10 mg Tab    ketorolac (TORADOL) injection 30 mg       #Back muscle spasm  Patient with sharp and shooting pains with motion, with physical examination and history consistent with back muscle spasm.  Discussed management of this condition at home including ice for the next 24 hours followed by heat therapy 3 times daily with gentle stretching thereafter.  Given significant pain, at this time have given ketorolac 30 mg IM injection once.  Advised patient to begin taking naproxen 440 milligram p.o. twice daily starting tomorrow morning for a total 7-day course.  Advised patient to take this with food.  Physician reviewed prior laboratory test which did not reveal signs of chronic injury to kidneys.  Given spastic pain, have also prescribed cyclobenzaprine 10 mg oral 3 times daily for 7-day course as needed for spasm.  Medication to pharmacy of choice.  Potential adverse effects of medication discussed.  Physician wrote note for patient to remain out of work for the next 24 hours, and return to work thereafter with no heavy lifting for at least 2 weeks.  Advised patient should pain fail to improve, should return for repeat evaluation in 1 week.  Patient verbalized agreement and understanding with  plan of care.    #Recurrent unilateral inguinal hernia  Patient with obstructed left inguinal hernia without signs of incarceration during today's examination.  Unclear why this has increased in size since onset of back muscle spasm, however suspect the patient may be increasing abdominal pressure as he attempts to unconsciously splint his back secondary to pain.  At this time physician has placed referral to general surgery for discussion of inguinal hernia repair.  Referrals process discussed.  Advised patient should he experience any signs/symptoms consistent with incarceration including local erythema, swelling, vomiting, nausea, blood in his stool, or other similar such symptoms, he should seek care at the emergency department immediately.  Patient verbalized agreement and understanding with plan of care.    Naresh Arango M.D.  Family Medicine Resident  PGY-4

## 2023-07-18 RX ORDER — OMEPRAZOLE 20 MG/1
20 CAPSULE, DELAYED RELEASE ORAL DAILY
COMMUNITY
End: 2023-07-18 | Stop reason: SDUPTHER

## 2023-07-19 ENCOUNTER — TELEPHONE (OUTPATIENT)
Dept: MEDICAL GROUP | Facility: CLINIC | Age: 68
End: 2023-07-19
Payer: MEDICARE

## 2023-07-19 DIAGNOSIS — K21.9 GASTROESOPHAGEAL REFLUX DISEASE, UNSPECIFIED WHETHER ESOPHAGITIS PRESENT: ICD-10-CM

## 2023-07-19 RX ORDER — OMEPRAZOLE 20 MG/1
20 CAPSULE, DELAYED RELEASE ORAL DAILY
Qty: 100 CAPSULE | Refills: 3 | Status: SHIPPED | OUTPATIENT
Start: 2023-07-19

## 2023-11-17 ENCOUNTER — TELEPHONE (OUTPATIENT)
Dept: MEDICAL GROUP | Facility: CLINIC | Age: 68
End: 2023-11-17
Payer: OTHER MISCELLANEOUS

## 2023-11-17 DIAGNOSIS — Z00.00 ENCOUNTER FOR PREVENTIVE CARE: ICD-10-CM

## 2023-11-17 DIAGNOSIS — Z12.5 PROSTATE CANCER SCREENING: ICD-10-CM

## 2023-11-17 DIAGNOSIS — E78.5 DYSLIPIDEMIA: ICD-10-CM

## 2023-11-17 DIAGNOSIS — E03.8 SUBCLINICAL HYPOTHYROIDISM: ICD-10-CM

## 2023-11-17 NOTE — TELEPHONE ENCOUNTER
Patient is requesting annual lab work orders, he has appt with Dr. Forde 11/20/23 in the morning when you are precepting, please advise thank you

## 2023-12-15 ENCOUNTER — OFFICE VISIT (OUTPATIENT)
Dept: MEDICAL GROUP | Facility: CLINIC | Age: 68
End: 2023-12-15
Payer: MEDICARE

## 2023-12-15 VITALS
HEART RATE: 58 BPM | OXYGEN SATURATION: 94 % | HEIGHT: 72 IN | TEMPERATURE: 98.2 F | DIASTOLIC BLOOD PRESSURE: 79 MMHG | BODY MASS INDEX: 26.51 KG/M2 | SYSTOLIC BLOOD PRESSURE: 148 MMHG | WEIGHT: 195.7 LBS

## 2023-12-15 DIAGNOSIS — E78.00 PURE HYPERCHOLESTEROLEMIA: ICD-10-CM

## 2023-12-15 DIAGNOSIS — M79.605 LEG PAIN, POSTERIOR, LEFT: ICD-10-CM

## 2023-12-15 DIAGNOSIS — I10 HYPERTENSION, UNSPECIFIED TYPE: ICD-10-CM

## 2023-12-15 DIAGNOSIS — F17.200 TOBACCO USE DISORDER: ICD-10-CM

## 2023-12-15 PROCEDURE — 3078F DIAST BP <80 MM HG: CPT | Performed by: STUDENT IN AN ORGANIZED HEALTH CARE EDUCATION/TRAINING PROGRAM

## 2023-12-15 PROCEDURE — 3077F SYST BP >= 140 MM HG: CPT | Performed by: STUDENT IN AN ORGANIZED HEALTH CARE EDUCATION/TRAINING PROGRAM

## 2023-12-15 PROCEDURE — 99214 OFFICE O/P EST MOD 30 MIN: CPT | Mod: GC | Performed by: STUDENT IN AN ORGANIZED HEALTH CARE EDUCATION/TRAINING PROGRAM

## 2023-12-15 RX ORDER — LOSARTAN POTASSIUM 25 MG/1
25 TABLET ORAL DAILY
Qty: 90 TABLET | Refills: 0 | Status: SHIPPED | OUTPATIENT
Start: 2023-12-15 | End: 2024-03-12

## 2023-12-15 RX ORDER — LOSARTAN POTASSIUM 50 MG/1
50 TABLET ORAL DAILY
Qty: 90 TABLET | Refills: 0 | Status: SHIPPED | OUTPATIENT
Start: 2023-12-15 | End: 2023-12-15

## 2023-12-15 ASSESSMENT — FIBROSIS 4 INDEX: FIB4 SCORE: 1.7

## 2023-12-15 NOTE — ASSESSMENT & PLAN NOTE
Patient encouraged to take blood pressure log.  Worksheet provided today.  Patient started on 25 mg of losartan.  Will assess whether or not to change losartan dosing based off of the blood pressure log at the next visit.  Patient return in 2 to 4 weeks with blood pressure log

## 2023-12-15 NOTE — ASSESSMENT & PLAN NOTE
YUVAL and Doppler ordered.  Will review upon return.  Patient encouraged to start statin medication.  He has declined today.

## 2023-12-15 NOTE — PROGRESS NOTES
"Subjective:     CC: Multiple complaints    HPI:   Roderick presents today with     Problem   Hypertension    The patient's blood pressure today was 148/79.  He says he does have a blood pressure cuff at home, however has not taken his blood pressure.  He is smoking now.  He does walk 1 to 2 miles a day.     Tobacco Use Disorder    Patient smokes 3 to 4 cigarettes every night after getting up for work.  He says it helps him relax.  He started smoking again at the beginning of this calendar year.  He is interested in quitting.     Leg Pain, Posterior, Left    Patient reported that he has been noticing cramping sensation in his left lower leg after walking for long periods of time.  Additionally, he notices this sensation after a long day at work.  He does not have the same sensation on the right.  He walks 1 to 2 miles daily.  He does smoke cigarettes at this time.     Pure Hypercholesterolemia    \"He cannot tolerate statins. Has high cholesterol.  but HDL 66.\"    Patient is concerned of having a stroke, just in general.  He said that his father had heart attack and he has had a heart attack in the past previously as well.  He is smoking now.  He is also reporting left lower extremity cramping after walking.  He says that he does not like taking statin medications because he does not like how they feel.  He says that he has completed calcium scoring this year, however cannot recall the results.         Current Outpatient Medications Ordered in Epic   Medication Sig Dispense Refill    nicotine (NICODERM) 7 MG/24HR PATCH 24 HR Place 1 Patch on the skin every 24 hours. 360 Patch 0    nicotine polacrilex (NICORETTE) 2 MG Gum Take 1 Each by mouth as needed for Smoking Cessation. 220 Each 1    losartan (COZAAR) 25 MG Tab Take 1 Tablet by mouth every day for 90 days. 90 Tablet 0    omeprazole (PRILOSEC) 20 MG delayed-release capsule Take 1 Capsule by mouth every day. 100 Capsule 3    TRINTELLIX 10 MG Tab       QUEtiapine " "(SEROQUEL) 25 MG Tab  (Patient not taking: Reported on 12/15/2023)      albuterol 108 (90 Base) MCG/ACT Aero Soln inhalation aerosol Inhale 2 Puffs every four hours as needed for Shortness of Breath. (Patient not taking: Reported on 1/4/2023) 1 Each 0    promethazine (PHENERGAN) 25 MG Tab Take 1 Tablet by mouth every 6 hours as needed for Nausea/Vomiting. (Patient not taking: Reported on 12/5/2022) 60 Tablet 1     No current Epic-ordered facility-administered medications on file.     ROS negative unless stated in HPI.    Objective:     Exam:  BP (!) 148/79 (BP Location: Left arm, Patient Position: Sitting, BP Cuff Size: Large adult)   Pulse (!) 58   Temp 36.8 °C (98.2 °F) (Temporal)   Ht 1.816 m (5' 11.5\")   Wt 88.8 kg (195 lb 11.2 oz)   SpO2 94%   BMI 26.91 kg/m²  Body mass index is 26.91 kg/m².    Physical Exam  Constitutional:       Appearance: Normal appearance.       Cardiovascular:      Rate and Rhythm: Normal rate and regular rhythm.   Pulmonary:      Effort: Pulmonary effort is normal. No respiratory distress.      Breath sounds: Normal breath sounds.   Skin:     General: Skin is warm and dry.   Neurological:      General: No focal deficit present.      Mental Status: He is alert and oriented to person, place, and time.   Psychiatric:         Behavior: Behavior normal.         Labs:   Results for orders placed or performed in visit on 12/12/23   CBC WITH DIFFERENTIAL   Result Value Ref Range    WBC 7.8 3.4 - 10.8 x10E3/uL    RBC 4.90 4.14 - 5.80 x10E6/uL    Hemoglobin 15.6 13.0 - 17.7 g/dL    Hematocrit 45.1 37.5 - 51.0 %    MCV 92 79 - 97 fL    MCH 31.8 26.6 - 33.0 pg    MCHC 34.6 31.5 - 35.7 g/dL    RDW 12.7 11.6 - 15.4 %    Platelet Count 243 150 - 450 x10E3/uL    Neutrophils-Polys 66 Not Estab. %    Lymphocytes 22 Not Estab. %    Monocytes 10 Not Estab. %    Eosinophils 1 Not Estab. %    Basophils 1 Not Estab. %    Immature Cells CANCELED     Neutrophils (Absolute) 5.1 1.4 - 7.0 x10E3/uL    Lymphs " (Absolute) 1.7 0.7 - 3.1 x10E3/uL    Monos (Absolute) 0.8 0.1 - 0.9 x10E3/uL    Eos (Absolute) 0.1 0.0 - 0.4 x10E3/uL    Baso (Absolute) 0.1 0.0 - 0.2 x10E3/uL    Immature Granulocytes 0 Not Estab. %    Immature Granulocytes (abs) 0.0 0.0 - 0.1 x10E3/uL    Nucleated RBC CANCELED     Comments-Diff CANCELED    COMP METABOLIC PANEL   Result Value Ref Range    Glucose 79 70 - 99 mg/dL    Bun 17 8 - 27 mg/dL    Creatinine 1.09 0.76 - 1.27 mg/dL    eGFR 73 >59 mL/min/1.73    Bun-Creatinine Ratio 16 10 - 24    Sodium 142 134 - 144 mmol/L    Potassium 4.4 3.5 - 5.2 mmol/L    Chloride 103 96 - 106 mmol/L    Co2 22 20 - 29 mmol/L    Calcium 9.2 8.6 - 10.2 mg/dL    Total Protein 7.3 6.0 - 8.5 g/dL    Albumin 4.6 3.9 - 4.9 g/dL    Globulin 2.7 1.5 - 4.5 g/dL    A-G Ratio 1.7 1.2 - 2.2    Total Bilirubin 0.7 0.0 - 1.2 mg/dL    Alkaline Phosphatase 82 44 - 121 IU/L    AST(SGOT) 30 0 - 40 IU/L    ALT(SGPT) 25 0 - 44 IU/L   LIPID PANEL   Result Value Ref Range    Cholesterol,Tot 210 (H) 100 - 199 mg/dL    Triglycerides 68 0 - 149 mg/dL    HDL 86 >39 mg/dL    VLDL Cholesterol Calc 12 5 - 40 mg/dL    LDL Chol Calc (NIH) 112 (H) 0 - 99 mg/dL    Comment: CANCELED    PROSTATE SPECIFIC AG   Result Value Ref Range    Prostatic Specific Antigen Tot 0.6 0.0 - 4.0 ng/mL   HEP C VIRUS ANTIBODY   Result Value Ref Range    Hepatitis C Antibody Non Reactive Non Reactive   TSH RFLX TO T4F   Result Value Ref Range    TSH 4.070 0.450 - 4.500 uIU/mL       Assessment & Plan:     67 y.o. male with the following -     Pure hypercholesterolemia  Patient counseled on the benefits of statin medications.  CT Heart- Calcium score reordered    Hypertension  Patient encouraged to take blood pressure log.  Worksheet provided today.  Patient started on 25 mg of losartan.  Will assess whether or not to change losartan dosing based off of the blood pressure log at the next visit.  Patient return in 2 to 4 weeks with blood pressure log    Tobacco use  disorder  Provided nicotine patches to use daily.  Patient also provided NicoDerm as needed    Leg pain, posterior, left  YUVAL and Doppler ordered.  Will review upon return.  Patient encouraged to start statin medication.  He has declined today.     Return in about 4 weeks (around 1/12/2024), or f/u imaging.

## 2023-12-26 DIAGNOSIS — F17.200 TOBACCO USE DISORDER: ICD-10-CM

## 2024-03-11 DIAGNOSIS — I10 HYPERTENSION, UNSPECIFIED TYPE: ICD-10-CM

## 2024-03-11 NOTE — TELEPHONE ENCOUNTER
Received request via: Pharmacy    Was the patient seen in the last year in this department? Yes    Does the patient have an active prescription (recently filled or refills available) for medication(s) requested? No    Pharmacy Name: [facility]    Does the patient have longterm Plus and need 100 day supply (blood pressure, diabetes and cholesterol meds only)? Patient does not have SCP

## 2024-03-12 RX ORDER — LOSARTAN POTASSIUM 25 MG/1
TABLET ORAL
Qty: 90 TABLET | Refills: 0 | Status: SHIPPED | OUTPATIENT
Start: 2024-03-12